# Patient Record
Sex: FEMALE | Race: WHITE | Employment: OTHER | URBAN - METROPOLITAN AREA
[De-identification: names, ages, dates, MRNs, and addresses within clinical notes are randomized per-mention and may not be internally consistent; named-entity substitution may affect disease eponyms.]

---

## 2024-02-02 ENCOUNTER — ANESTHESIA EVENT (OUTPATIENT)
Facility: HOSPITAL | Age: 69
End: 2024-02-02
Payer: MEDICARE

## 2024-02-02 ENCOUNTER — HOSPITAL ENCOUNTER (OUTPATIENT)
Facility: HOSPITAL | Age: 69
Setting detail: OUTPATIENT SURGERY
Discharge: HOME | End: 2024-02-02
Attending: SPECIALIST | Admitting: SPECIALIST
Payer: MEDICARE

## 2024-02-02 ENCOUNTER — ANESTHESIA (OUTPATIENT)
Facility: HOSPITAL | Age: 69
End: 2024-02-02
Payer: MEDICARE

## 2024-02-02 VITALS
HEART RATE: 64 BPM | SYSTOLIC BLOOD PRESSURE: 145 MMHG | BODY MASS INDEX: 28.51 KG/M2 | WEIGHT: 151.01 LBS | HEIGHT: 61 IN | OXYGEN SATURATION: 99 % | RESPIRATION RATE: 21 BRPM | TEMPERATURE: 97.8 F | DIASTOLIC BLOOD PRESSURE: 73 MMHG

## 2024-02-02 PROCEDURE — 6360000002 HC RX W HCPCS: Performed by: NURSE ANESTHETIST, CERTIFIED REGISTERED

## 2024-02-02 PROCEDURE — 3600007502: Performed by: SPECIALIST

## 2024-02-02 PROCEDURE — 3700000001 HC ADD 15 MINUTES (ANESTHESIA): Performed by: SPECIALIST

## 2024-02-02 PROCEDURE — 2580000003 HC RX 258: Performed by: SPECIALIST

## 2024-02-02 PROCEDURE — 3600007512: Performed by: SPECIALIST

## 2024-02-02 PROCEDURE — 7100000010 HC PHASE II RECOVERY - FIRST 15 MIN: Performed by: SPECIALIST

## 2024-02-02 PROCEDURE — 88305 TISSUE EXAM BY PATHOLOGIST: CPT

## 2024-02-02 PROCEDURE — 6370000000 HC RX 637 (ALT 250 FOR IP): Performed by: SPECIALIST

## 2024-02-02 PROCEDURE — 3700000000 HC ANESTHESIA ATTENDED CARE: Performed by: SPECIALIST

## 2024-02-02 PROCEDURE — 7100000011 HC PHASE II RECOVERY - ADDTL 15 MIN: Performed by: SPECIALIST

## 2024-02-02 RX ORDER — ESCITALOPRAM OXALATE 10 MG/1
10 TABLET ORAL DAILY
COMMUNITY
Start: 2024-01-20

## 2024-02-02 RX ORDER — SIMETHICONE 20 MG/.3ML
EMULSION ORAL PRN
Status: DISCONTINUED | OUTPATIENT
Start: 2024-02-02 | End: 2024-02-02 | Stop reason: ALTCHOICE

## 2024-02-02 RX ORDER — SODIUM CHLORIDE 0.9 % (FLUSH) 0.9 %
5-40 SYRINGE (ML) INJECTION PRN
Status: DISCONTINUED | OUTPATIENT
Start: 2024-02-02 | End: 2024-02-02 | Stop reason: HOSPADM

## 2024-02-02 RX ORDER — PROPOFOL 10 MG/ML
INJECTION, EMULSION INTRAVENOUS PRN
Status: DISCONTINUED | OUTPATIENT
Start: 2024-02-02 | End: 2024-02-02 | Stop reason: SDUPTHER

## 2024-02-02 RX ORDER — METOPROLOL SUCCINATE 25 MG/1
25 TABLET, EXTENDED RELEASE ORAL DAILY
COMMUNITY
Start: 2024-01-18

## 2024-02-02 RX ORDER — SODIUM CHLORIDE 9 MG/ML
INJECTION, SOLUTION INTRAVENOUS CONTINUOUS
Status: DISCONTINUED | OUTPATIENT
Start: 2024-02-02 | End: 2024-02-02 | Stop reason: HOSPADM

## 2024-02-02 RX ORDER — IBANDRONATE SODIUM 150 MG/1
150 TABLET, FILM COATED ORAL
COMMUNITY

## 2024-02-02 RX ADMIN — SODIUM CHLORIDE: 9 INJECTION, SOLUTION INTRAVENOUS at 13:14

## 2024-02-02 RX ADMIN — PROPOFOL 80 MG: 10 INJECTION, EMULSION INTRAVENOUS at 13:28

## 2024-02-02 RX ADMIN — PROPOFOL 120 MCG/KG/MIN: 10 INJECTION, EMULSION INTRAVENOUS at 13:29

## 2024-02-02 ASSESSMENT — PAIN - FUNCTIONAL ASSESSMENT: PAIN_FUNCTIONAL_ASSESSMENT: 0-10

## 2024-02-02 NOTE — ANESTHESIA PRE PROCEDURE
Department of Anesthesiology  Preprocedure Note       Name:  Veronika Riley   Age:  68 y.o.  :  1955                                          MRN:  [id number]         Date:  2024      Surgeon: Surgeon(s):  Siva Izquierdo MD    Procedure: Procedure(s):  COLONOSCOPY    Medications prior to admission:   Prior to Admission medications    Medication Sig Start Date End Date Taking? Authorizing Provider   escitalopram (LEXAPRO) 10 MG tablet Take 1 tablet by mouth daily 24  Yes Agnes Ng MD   metoprolol succinate (TOPROL XL) 25 MG extended release tablet Take 1 tablet by mouth daily 24  Yes Agnes Ng MD   Multiple Vitamins-Minerals (CENTRUM SILVER ADULT 50+ PO) Take by mouth   Yes Agnes Ng MD   ibandronate (BONIVA) 150 MG tablet Take 1 tablet by mouth every 30 days    Agnes Ng MD   calcium carbonate 1500 (600 Ca) MG TABS tablet Take 1 tablet by mouth    Agnes Ng MD       Current medications:    Current Facility-Administered Medications   Medication Dose Route Frequency Provider Last Rate Last Admin    0.9 % sodium chloride infusion   IntraVENous Continuous Siva Izquierdo MD        sodium chloride flush 0.9 % injection 5-40 mL  5-40 mL IntraVENous PRN Siva Izquierdo MD           Allergies:    Allergies   Allergen Reactions    Codeine Itching       Problem List:  There is no problem list on file for this patient.      Past Medical History:        Diagnosis Date    Hypertension        Past Surgical History:        Procedure Laterality Date    BREAST REDUCTION SURGERY Bilateral     HYSTERECTOMY (CERVIX STATUS UNKNOWN)         Social History:    Social History     Tobacco Use    Smoking status: Never    Smokeless tobacco: Never   Substance Use Topics    Alcohol use: Yes     Alcohol/week: 4.0 standard drinks of alcohol     Types: 4 Glasses of wine per week     Comment: weekends                                Counseling given:

## 2024-02-02 NOTE — ANESTHESIA POSTPROCEDURE EVALUATION
Department of Anesthesiology  Postprocedure Note    Patient: Veronika Riley  MRN: [id number]  YOB: 1955  Date of evaluation: 2/2/2024    Procedure Summary       Date: 02/02/24 Room / Location: [facility] 02 / [facility] ENDOSCOPY    Anesthesia Start: 1315 Anesthesia Stop: 1352    Procedure: COLONOSCOPY (Lower GI Region) Diagnosis:       Screening for colon cancer      (Screening for colon cancer [Z12.11])    Surgeons: Siva Izquierdo MD Responsible Provider: Stewart Rocha MD    Anesthesia Type: MAC ASA Status: 2            Anesthesia Type: No value filed.    Juan J Phase I: Juan J Score: 10    Juan J Phase II: Juan J Score: 10    Anesthesia Post Evaluation    Patient location during evaluation: bedside  Patient participation: complete - patient participated  Level of consciousness: awake and alert and responsive to verbal stimuli  Pain score: 0  Airway patency: patent  Nausea & Vomiting: no nausea and no vomiting  Cardiovascular status: hemodynamically stable  Respiratory status: acceptable and room air  Hydration status: stable  Pain management: adequate    No notable events documented.

## 2024-02-02 NOTE — H&P
Pre-Endoscopy H&P Update  Chief complaint/HPI/ROS:  The indication for the procedure, the patient's history and the patient's current medications are reviewed prior to the procedure and that data is reported on the H&P to which this document is attached.  Any significant complaints with regard to organ systems will be noted, and if not mentioned then a review of systems is not contributory.  Past Medical History:   Diagnosis Date    Arthritis     Hypertension     Osteoporosis       Past Surgical History:   Procedure Laterality Date    BREAST REDUCTION SURGERY Bilateral     LAPAROSCOPIC HYSTERECTOMY      total     Social   Social History     Tobacco Use    Smoking status: Never    Smokeless tobacco: Never   Substance Use Topics    Alcohol use: Yes     Alcohol/week: 4.0 standard drinks of alcohol     Types: 4 Glasses of wine per week     Comment: weekends      History reviewed. No pertinent family history.   Allergies   Allergen Reactions    Codeine Itching      Prior to Admission Medications   Prescriptions Last Dose Informant Patient Reported? Taking?   Multiple Vitamins-Minerals (CENTRUM SILVER ADULT 50+ PO) 2/1/2024  Yes Yes   Sig: Take by mouth   calcium carbonate 1500 (600 Ca) MG TABS tablet 2/1/2024  Yes No   Sig: Take 1 tablet by mouth   escitalopram (LEXAPRO) 10 MG tablet 2/1/2024  Yes Yes   Sig: Take 1 tablet by mouth daily   ibandronate (BONIVA) 150 MG tablet Past Month  Yes No   Sig: Take 1 tablet by mouth every 30 days   metoprolol succinate (TOPROL XL) 25 MG extended release tablet 2/2/2024  Yes Yes   Sig: Take 1 tablet by mouth daily      Facility-Administered Medications: None       PHYSICAL EXAM:  The patient is examined immediately prior to the procedure.  Vitals:    02/02/24 1225   BP: 139/65   Pulse: 62   Resp: 10   Temp: 98 °F (36.7 °C)   SpO2: 97%     Gen: Appears comfortable, no distress.  Pulm: comfortable respirations with no abnormal audible breath sounds  HEART: well perfused, no abnormal  audible heart sounds  GI: abdomen flat.    PLAN:  Informed consent discussion held, patient afforded an opportunity to ask questions and all questions answered.  After being advised of the risks, benefits, and alternatives, the patient requested that we proceed and indicated so on a written consent form.      Will proceed with procedure as planned.  Siva Izquierdo MD

## 2024-02-02 NOTE — DISCHARGE INSTRUCTIONS
[facility]  [facility]  Siva Izquierdo MD  [phone]      February 2, 2024    Veronika Riley  YOB: 1955    COLONOSCOPY DISCHARGE INSTRUCTIONS    If there is redness at IV site you should apply warm compress to area.  If redness or soreness persist contact Dr. Izquierdo' or your primary care doctor.    There may be a slight amount of blood passed from the rectum.  Gaseous discomfort may develop, but walking, belching will help relieve this.  You may not operate a vehicle for 12 hours  You may not operate machinery or dangerous appliances for rest of today  You may not drink alcoholic beverages for 12 hours  Avoid making any critical decisions for 24 hours    DIET:  You may resume your normal diet, but some patients find that heavy or large meals may lead to indigestion or vomiting.  I suggest a light meal as first food intake.    MEDICATIONS:  The use of some over-the-counter pain medication may lead to bleeding after colon biopsies or polyp removal.  Tylenol (also called acetaminophen) is safe to take even if you have had colonoscopy with polyp removal.  Based on the procedure you had today you may not safely take aspirin or aspirin-like products for the next ten (10) days.  Remember that Tylenol (also called acetaminophen) is safe to take after colonoscopy even if you have had biopsies or polyps removed.    ACTIVITY:  You may resume your normal household activities, but it is recommended that you spend the remainder of the day resting -  avoid any strenuous activity.    CALL DR. IZQUIERDO' OFFICE IF:  Increasing pain, nausea, vomiting  Abdominal distension (swelling)  Significant new or increased bleeding (oral or rectal)  Fever/Chills  Chest pain/shortness of breath                       Additional instructions:   Colonoscopy revealed only one very small polyp which was removed.  I'll contact you with the polyp results in 10-14

## 2024-02-02 NOTE — OP NOTE
[facility]  [facility]  Siva Izquierdo MD  [phone]      2024    Colonoscopy Procedure Note  Veronika Riley  :  1955  [facility] Medical Record Number: [id number]    Indications:     Hematochezia/melena  PCP:  Zora Klein MD  Anesthesia/Sedation: Conscious Sedation/Moderate Sedation/GETA, see notes  Endoscopist:  Dr. Siva Izquierdo  Complications:  None  Estimated Blood Loss:  None    Permit:  The indications, risks, benefits and alternatives were reviewed with the patient or their decision maker who was provided an opportunity to ask questions and all questions were answered.  The specific risks of colonoscopy with conscious sedation were reviewed, including but not limited to anesthetic complication, bleeding, adverse drug reaction, missed lesion, infection, IV site reactions, and intestinal perforation which would lead to the need for surgical repair.  Alternatives to colonoscopy including radiographic imaging, observation without testing, or laboratory testing were reviewed including the limitations of those alternatives.  After considering the options and having all their questions answered, the patient or their decision maker provided both verbal and written consent to proceed.        Procedure in Detail:  After obtaining informed consent, positioning of the patient in the left lateral decubitus position, and conduction of a pre-procedure pause or \"time out\" the endoscope was introduced into the anus and advanced to the cecum, which was identified by the ileocecal valve and appendiceal orifice.  The quality of the colonic preparation was good.  A careful inspection was made as the colonoscope was withdrawn, findings and interventions are described below.    Findings:   3mm transverse colon polyp removed with cold snare, retrieved, and hemostasis confirmed.    Specimens:    See above    Complications:    None; patient tolerated the procedure well.    Impression:  Colon polyp    Recommendations:     - Await pathology.    Thank you for entrusting me with this patient's care.  Please do not hesitate to contact me with any questions or if I can be of assistance with any of your other patients' GI needs.    Signed By: Siva Izquierdo MD                        February 2, 2024      Surgical assistant none.  Implants none unless specified.

## 2024-02-02 NOTE — PERIOP NOTE
1322   Patient has been evaluated by anesthesia pre-procedure.    Patient alert and oriented.     Vital signs will not be charted by the Endoscopy nurse.     All vitals, airway, and loc are monitored by anesthesia staff, Dr. DAVON Rocha (and Anibal) throughout procedure.         1350   Endoscope was pre-cleaned at bedside immediately following procedure by endo techChristie.      1356   Patient tolerated procedure.   Abdomen soft and patient arousable and voices no complaints.   Patient transported to endoscopy recovery area.   Report given to post procedure RNAllison.

## 2024-02-02 NOTE — H&P
Date: 10/24/2023 12:45 PM   Patient Name: Veronika Riley   Account #: [id number]    Gender: Female    (age): 1955 (68)      Provider: Luke Grove MD      Referring Physician: Zora Klein  [address] [place] [address] [employer] [address] [address] [phone] (phone)  [phone] (fax)      Chief Complaint:          History of Present Illness:   68-year-old female  presenting for evaluation of ER visit follow up.     Recently had ER presentation at once a course in 2023 with abdominal pain and bloody diarrhea.  CT scan of the abdomen had shown acute colitis of the left descending colon and diverticulosis.  She was discharged home on azithromycin. Symptoms resolved in 3 days.     Back to baseline of constipation. Gtes a BM almost every day but the bowel movements are small. Takes miralax 2-3 days in a week. No nausea, vomiting or abdominal pain. No weight loss.     Last colonoscopy in  was reportedly normal. No polyps previously. No FH of colon cancer.            Past Medical History      Medical Conditions: Arthritis   Surgical Procedures: Total Hysterectomy  Tummy Tuck   Medications: Calcium 500 + D 500 mg-5 mcg (200 unit) Take 1 tablet by mouth once a day  ibandronate 150 mg Take 1 tablet by mouth once a month  Miralax 17 gram Drink 1 paket dissolved in water once a day  multivitamin Take 1 tablet by mouth once a day   Allergies: Codeine   Immunizations: COVID Vaccine, 2023  Influenza vaccination, 2023      Social History      Alcohol: Alcohol consumption: Weekly.   Tobacco: Never smoker   Drugs: None   Exercise: Exercise 3 or more times a week.   Caffeine: Daily.   Marital Status:               \" displaymode=\"List\" defaultnarrative=\"[object Object]\" isempty=\"false\" style=\"text-rendering: geometricprecision; font-family: \"Open Sans\", sans-serif !important; -webkit-font-smoothing: antialiased !important; user-select: text !important;\">  Impressions: Constipation,  unspecified  Colitis      Assessment: Constipation-advised to start using MiraLAX every day as it helps with her constipation.  Add fiber supplementation with Metamucil as needed.  Educated about high-fiber diet, adequate hydration.      Colitis-as noted on recent CT scan of the abdomen.  Likely had a bout of bacterial gastroenteritis which has completely resolved at this time.  Will get colonoscopy for further evaluation.      Plan: Education handout on BMI Healthy Weight  Miralax 17 gram Drink 1 paket dissolved in water once a day  Start fiber supplementation with Metamucil. Please give patient Metamucil handout.  Colonoscopy  peg 3350-electrolytes 236-22.74-6.74-5.86 gram Follow prep instructions from your doctor's office, not the brand label.      Risk & Medical Necessity:              Luke Grove MD     Electronically signed on 10/24/2023 1:05:54 PM by Luke Grove MD                Veronika Riley, MRN [id number],  1955 First Visit, 2023

## 2025-08-10 ENCOUNTER — APPOINTMENT (OUTPATIENT)
Facility: HOSPITAL | Age: 70
End: 2025-08-10
Payer: MEDICARE

## 2025-08-10 ENCOUNTER — HOSPITAL ENCOUNTER (EMERGENCY)
Facility: HOSPITAL | Age: 70
Discharge: HOME | End: 2025-08-10
Attending: FAMILY MEDICINE
Payer: MEDICARE

## 2025-08-10 VITALS
HEIGHT: 61 IN | DIASTOLIC BLOOD PRESSURE: 95 MMHG | SYSTOLIC BLOOD PRESSURE: 153 MMHG | BODY MASS INDEX: 28.7 KG/M2 | TEMPERATURE: 97.8 F | WEIGHT: 152 LBS | HEART RATE: 85 BPM | RESPIRATION RATE: 16 BRPM | OXYGEN SATURATION: 97 %

## 2025-08-10 DIAGNOSIS — S01.81XA LACERATION OF FOREHEAD, INITIAL ENCOUNTER: Primary | ICD-10-CM

## 2025-08-10 PROCEDURE — 99284 EMERGENCY DEPT VISIT MOD MDM: CPT

## 2025-08-10 PROCEDURE — 12011 RPR F/E/E/N/L/M 2.5 CM/<: CPT

## 2025-08-10 PROCEDURE — 6360000002 HC RX W HCPCS

## 2025-08-10 PROCEDURE — 70450 CT HEAD/BRAIN W/O DYE: CPT

## 2025-08-10 RX ORDER — LIDOCAINE HYDROCHLORIDE 10 MG/ML
20 INJECTION, SOLUTION INFILTRATION; PERINEURAL
Status: COMPLETED | OUTPATIENT
Start: 2025-08-10 | End: 2025-08-10

## 2025-08-10 RX ADMIN — LIDOCAINE HYDROCHLORIDE 20 ML: 10 INJECTION, SOLUTION INFILTRATION; PERINEURAL at 12:36

## 2025-08-10 ASSESSMENT — PAIN - FUNCTIONAL ASSESSMENT: PAIN_FUNCTIONAL_ASSESSMENT: 0-10

## 2025-08-10 ASSESSMENT — PAIN SCALES - GENERAL: PAINLEVEL_OUTOF10: 1
